# Patient Record
Sex: FEMALE | Employment: FULL TIME | URBAN - METROPOLITAN AREA
[De-identification: names, ages, dates, MRNs, and addresses within clinical notes are randomized per-mention and may not be internally consistent; named-entity substitution may affect disease eponyms.]

---

## 2020-09-24 ENCOUNTER — PATIENT MESSAGE (OUTPATIENT)
Dept: NEUROSURGERY | Facility: CLINIC | Age: 40
End: 2020-09-24

## 2020-09-24 ENCOUNTER — TELEPHONE (OUTPATIENT)
Dept: NEUROSURGERY | Facility: CLINIC | Age: 40
End: 2020-09-24

## 2020-09-24 NOTE — TELEPHONE ENCOUNTER
----- Message from Marilyn Cantu sent at 9/24/2020 12:49 PM CDT -----  Patient wants to come in to bring her MRI to the office tomorrow.  She would like to know what hours staff will be there to receive it.  Please call her at [phone]. Thank you!

## 2020-10-01 ENCOUNTER — OFFICE VISIT (OUTPATIENT)
Dept: NEUROSURGERY | Facility: CLINIC | Age: 40
End: 2020-10-01
Payer: COMMERCIAL

## 2020-10-01 VITALS
HEIGHT: 66 IN | WEIGHT: 189 LBS | BODY MASS INDEX: 30.37 KG/M2 | HEART RATE: 111 BPM | SYSTOLIC BLOOD PRESSURE: 130 MMHG | DIASTOLIC BLOOD PRESSURE: 86 MMHG

## 2020-10-01 DIAGNOSIS — Z98.890 HISTORY OF LUMBAR LAMINECTOMY FOR SPINAL CORD DECOMPRESSION: ICD-10-CM

## 2020-10-01 DIAGNOSIS — M51.36 DDD (DEGENERATIVE DISC DISEASE), LUMBAR: Primary | ICD-10-CM

## 2020-10-01 DIAGNOSIS — M43.10 RETROLISTHESIS OF VERTEBRAE: ICD-10-CM

## 2020-10-01 PROBLEM — M79.604 LOW BACK PAIN RADIATING TO RIGHT LEG: Status: ACTIVE | Noted: 2020-06-12

## 2020-10-01 PROBLEM — M25.60 JOINT STIFFNESS OF SPINE: Status: ACTIVE | Noted: 2020-06-12

## 2020-10-01 PROBLEM — M62.838 MUSCLE SPASM: Status: ACTIVE | Noted: 2020-06-12

## 2020-10-01 PROBLEM — R29.3 ABNORMAL POSTURE: Status: ACTIVE | Noted: 2020-06-12

## 2020-10-01 PROBLEM — M54.50 LOW BACK PAIN RADIATING TO RIGHT LEG: Status: ACTIVE | Noted: 2020-06-12

## 2020-10-01 PROCEDURE — 99999 PR PBB SHADOW E&M-EST. PATIENT-LVL III: ICD-10-PCS | Mod: PBBFAC,,, | Performed by: STUDENT IN AN ORGANIZED HEALTH CARE EDUCATION/TRAINING PROGRAM

## 2020-10-01 PROCEDURE — 99999 PR PBB SHADOW E&M-EST. PATIENT-LVL III: CPT | Mod: PBBFAC,,, | Performed by: STUDENT IN AN ORGANIZED HEALTH CARE EDUCATION/TRAINING PROGRAM

## 2020-10-01 PROCEDURE — 99204 OFFICE O/P NEW MOD 45 MIN: CPT | Mod: S$GLB,,, | Performed by: STUDENT IN AN ORGANIZED HEALTH CARE EDUCATION/TRAINING PROGRAM

## 2020-10-01 PROCEDURE — 99204 PR OFFICE/OUTPT VISIT, NEW, LEVL IV, 45-59 MIN: ICD-10-PCS | Mod: S$GLB,,, | Performed by: STUDENT IN AN ORGANIZED HEALTH CARE EDUCATION/TRAINING PROGRAM

## 2020-10-01 RX ORDER — TOPIRAMATE 150 MG/1
CAPSULE, EXTENDED RELEASE ORAL
COMMUNITY
Start: 2020-09-29

## 2020-10-01 RX ORDER — CYCLOBENZAPRINE HCL 10 MG
TABLET ORAL
COMMUNITY
Start: 2020-09-30

## 2020-10-01 RX ORDER — ALPRAZOLAM 1 MG/1
TABLET ORAL
COMMUNITY
Start: 2020-09-23

## 2020-10-01 RX ORDER — DULOXETIN HYDROCHLORIDE 20 MG/1
CAPSULE, DELAYED RELEASE ORAL
COMMUNITY
Start: 2020-06-25

## 2020-10-01 RX ORDER — LEVOTHYROXINE, LIOTHYRONINE 38; 9 UG/1; UG/1
TABLET ORAL
COMMUNITY
Start: 2020-09-11

## 2020-10-01 NOTE — PROGRESS NOTES
[facility]  Clinic Consult     Consult Requested By: Hank Barrett  PCP: Delmis Villavicencio MD    SUBJECTIVE:     Chief Complaint:   Chief Complaint   Patient presents with    Lumbar Spine Pain (L-Spine)     patient reports history of laminectomy; patient reports low back pain radiating into the bilateral legs; right leg worse; numbness and tingling present; pain 7/10       History of Present Illness:  Yojana Maynard is a 40 y.o. female without any major medical problems who presents for evaluation of low back and right leg pain. Patient has history of lumbar laminectomy x2 in 2017 for treatment of low back and right leg pain. She states she initially did well following surgery, but the pain returned in May 2020. She states the pain has progressively worsened. She reports low back pain that radiates down the right leg in the S1 distribution. She describes the leg pain as intermittent lightening bolts with associated numbness and tingling. She states the pain interrupts her sleep. She reports weakness when she tries to push the gas pedal at times. She denies bowel/bladder dysfunction. She is attending PT with worsening of pain doing exercises. She reports the tens unit and dry needling are no longer providing relief. She takes ibuprofen 800 mg TID. She cannot tolerate steroids due to a previous reaction.     Pertinent and recent history, provider evaluations, imaging and data reviewed in EPIC      VAS 7/10  PHQ 5  Oswestry Disability Index 40    History reviewed. No pertinent past medical history.  Past Surgical History:   Procedure Laterality Date    LUMBAR LAMINECTOMY  2017    x2     History reviewed. No pertinent family history.  Social History     Tobacco Use    Smoking status: Current Every Day Smoker   Substance Use Topics    Alcohol use: Not on file    Drug use: Not on file      Review of patient's allergies indicates:  No Known Allergies    Current Outpatient Medications:     ALPRAZolam  "(XANAX) 1 MG tablet, , Disp: , Rfl:     cyclobenzaprine (FLEXERIL) 10 MG tablet, , Disp: , Rfl:     NP THYROID 60 mg Tab, TK 1 T PO QD, Disp: , Rfl:     QUDEXY  mg CSpX, , Disp: , Rfl:     DULoxetine (CYMBALTA) 20 MG capsule, , Disp: , Rfl:     Review of Systems:   Constitutional: no fever, chills or night sweats. No changes in weight   Eyes: no visual changes   ENT: no nasal congestion or sore throat   Respiratory: no cough or shortness of breath   Cardiovascular: no chest pain or palpitations   Gastrointestinal: no nausea or vomiting   Genitourinary: no hematuria or dysuria   Integument/Breast: no rash or pruritis   Hematologic/Lymphatic: no easy bruising or lymphadenopathy   Musculoskeletal: +back pain, leg pain   Neurological: no seizures or tremors +paresthesias   Behavioral/Psych: no auditory or visual hallucinations   Endocrine: no heat or cold intolerance         OBJECTIVE:     Vital Signs (Most Recent):  Pulse: (!) 111 (10/01/20 1318)  BP: 130/86 (10/01/20 1318)  Estimated body mass index is 30.51 kg/m² as calculated from the following:    Height as of this encounter: 5' 6" (1.676 m).    Weight as of this encounter: 85.7 kg (189 lb).    Physical Exam:   General: well developed, well nourished, no distress.   Neurologic: Alert and oriented. Thought content appropriate. GCS 15.   Language: No aphasia  Speech: No dysarthria  Head: normocephalic, atraumatic  Eyes: EOMI.  Neck: trachea midline, no JVD   Cardiovascular: no LE edema  Pulmonary: normal respirations, no signs of respiratory distress  Abdomen: non-distended  Sensory: intact to light touch throughout  Skin: Skin is warm, dry and intact.    Motor Strength: Moves all extremities spontaneously with good tone. No abnormal movements seen.     Strength  Deltoids Triceps Biceps Wrist Extension Wrist Flexion Hand  Interossei   Upper: R 5/5 5/5 5/5 5/5 5/5 5/5 5/5    L 5/5 5/5 5/5 5/5 5/5 5/5 5/5     Iliopsoas Quadriceps Knee  Flexion " Tibialis  anterior Gastro- cnemius EHL    Lower: R 5/5 5/5 5/5 5/5 5/5 5/5     L 5/5 5/5 5/5 5/5 5/5 5/5      DTR's: 2 +   Clonus: absent  Gait: normal             Able to walk on heels & toes    Lumbar Spine: decreased ROM secondary to pain, TTP  +SLR right           Diagnostic Results:  I have independently reviewed the following imaging:  MRI: Reviewed  r  L5-S1 severe degenerative disc disease slight retrolisthesis greater than 50% loss of disc height, there is Modic endplate changes there is no central stenosis with bilateral foraminal stenosis  ASSESSMENT/PLAN:     DDD (degenerative disc disease), lumbar    History of lumbar laminectomy for spinal cord decompression    Retrolisthesis of vertebrae        Yojana Maynard is a 40 y.o. female  She has a history of a lumbar decompression at L5-S1 by another surgeon.  She states she had about 3 years of relief and.  Has had progressive back pain and radiculopathy over the last 1 year  This continues to get worse.  She has tried multiple interventions has had transient relief from epidural steroid injections, has attempted activity modification and physical therapy without any sustaining benefit  She has severe pain with a high frequency and severity.  She is very functional as a  is here for evaluation for additional treatment options  She is is very educated and has proceeded with extensive conservative therapy without the ability finding relief.  This is seriously affecting her activities of daily living standing work function home life, showing 40 years old.  She has post laminectomy degeneration with degenerative disc disease Modic endplate changes as well as foraminal stenosis she has back pain which is positional as well as radiculopathy  We have thoroughly discussed the risk and benefits of proceeding with surgical intervention versus continued conservative management  She is ready to proceed with surgery.  She understands the options which include  a facetectomy or interbody fusion for indirect decompression, requiring stabilization and fusion at L5-S1  He is thoroughly counseled on the risk benefits, including failure adjacent segment disease among others  She has an indication for L5-S1 interbody fusion.  She understands this is an elective procedure and the goal is to avoid surgery however she has loss functional quality of  We discussed options including an anterior lumbar interbody fusion/olif and approaches , psf L5-S1  She will get xray and CT for planning but wants to proceed    The diagnosis, goals, limitations, risks and benefits of surgery and alternative treatment options where discussed at length (pros/cons). All questions/concerns were addressed. The patient has verbalized a good understanding of the diagnosis, the planned procedure, anticipated post-operative course, overall expectations, and risks including but not limited to: abdominal organ injury, hernia  nerve root injury/paralysis, death, nerve injury leading to pain or neurological deficit, csf leak, vascular injury or serious bleeding/need for blood product transfusion/stroke, wrong level surgery, hardware/instrumenteation misplacement, chronic pain/failure to improve or worsening of symptoms, infection, pseudoarthrosis, hardware failure, need for further surgery at the same or different levels; medical (eg Heart attack, blood clot, infection) and anesthetic complications.      Patient verbalized understanding of plan. Encouraged to call with any questions or concerns.     This note was partially dictated using voice recognition software, so please excuse any errors that were not corrected.

## 2020-10-06 DIAGNOSIS — M51.36 DDD (DEGENERATIVE DISC DISEASE), LUMBAR: Primary | ICD-10-CM

## 2020-10-06 RX ORDER — LIDOCAINE HYDROCHLORIDE 10 MG/ML
1 INJECTION, SOLUTION EPIDURAL; INFILTRATION; INTRACAUDAL; PERINEURAL ONCE
Status: CANCELLED | OUTPATIENT
Start: 2020-10-06 | End: 2020-10-06

## 2020-10-06 RX ORDER — SODIUM CHLORIDE, SODIUM LACTATE, POTASSIUM CHLORIDE, CALCIUM CHLORIDE 600; 310; 30; 20 MG/100ML; MG/100ML; MG/100ML; MG/100ML
INJECTION, SOLUTION INTRAVENOUS CONTINUOUS
Status: CANCELLED | OUTPATIENT
Start: 2020-10-06